# Patient Record
Sex: FEMALE | Race: WHITE | NOT HISPANIC OR LATINO | URBAN - METROPOLITAN AREA
[De-identification: names, ages, dates, MRNs, and addresses within clinical notes are randomized per-mention and may not be internally consistent; named-entity substitution may affect disease eponyms.]

---

## 2017-01-18 ENCOUNTER — OFFICE VISIT - RIVER FALLS (OUTPATIENT)
Dept: FAMILY MEDICINE | Facility: CLINIC | Age: 32
End: 2017-01-18

## 2017-01-18 ASSESSMENT — MIFFLIN-ST. JEOR: SCORE: 1365.14

## 2022-02-11 VITALS
TEMPERATURE: 98.3 F | SYSTOLIC BLOOD PRESSURE: 120 MMHG | DIASTOLIC BLOOD PRESSURE: 62 MMHG | BODY MASS INDEX: 29.81 KG/M2 | HEART RATE: 76 BPM | WEIGHT: 162 LBS | HEIGHT: 62 IN

## 2022-02-16 NOTE — PROGRESS NOTES
Patient:   LESLIE GRIDER            MRN: [id number]            FIN: [id number]               Age:   31 years     Sex:  Female     :  1985   Associated Diagnoses:   Bacterial conjunctivitis   Author:   Dmitri KHAN, [place]      Report Summary   Diagnosis  Bacterial conjunctivitis ([id number] H10.89).  Patient InstructionsOrders   Visit Information   Visit type:  New symptom.    Accompanied by:  No one.    Source of history:  Self.    Referral source:  Self.    History limitation:  None.       Chief Complaint   2017 3:42 PM CST    New patient- Right eye pain and pus x 2 days        History of Present Illness             The patient presents with eye discharge.  The eye discharge is located in the right eye.  The eye discharge is described as a moderate amount, crusty and green-colored.  The severity of the eye discharge is moderate.  The eye discharge is worsening.  Went swimming with contacts in over the weekend. No change to contact care. Vision fine with glasses. Purulent discharge right eye with injection. Itchy. CC above noted and confirmed with the patient..  There are no modifying factors.  Associated symptoms consist of itchy eyes.  CC above noted and confirmed with the patient..        Review of Systems   Constitutional:  Negative.    Eye:  Negative except as documented in history of present illness.    Ear/Nose/Mouth/Throat:  Nasal congestion.    Respiratory:  Cough.    Gastrointestinal:  Negative.    Genitourinary:  Negative.    Hematology/Lymphatics:  Negative.    Endocrine:  Negative.    Immunologic:  Negative.    Musculoskeletal:  Muscle pain.    Integumentary:  Negative.    Neurologic:  Headache.       Health Status   Allergies:    Allergic Reactions (All)  No Known Medication Allergies   Problem list:    All Problems  Obesity / SNOMED CT 3516671372 / Probable   Medications:  (Selected)   Prescriptions  Prescribed  ciprofloxacin 0.3% ophthalmic solution: 1 drop, both eyes, tid, x 5 day(s), #  5 mL, 0 Refill(s), Type: Acute, Pharmacy: [id number], 1 drop both eyes tid,x5 day(s)      Histories   Past Medical History:    No active or resolved past medical history items have been selected or recorded.   Family History:    No family history items have been selected or recorded.   Procedure history:    No active procedure history items have been selected or recorded.   Social History:             No active social history items have been recorded.      Physical Examination   Vital Signs   1/18/2017 3:42 PM CST Temperature Tympanic 98.3 DegF    Peripheral Pulse Rate 76 bpm    Pulse Site Radial artery    HR Method Manual    Systolic Blood Pressure 120 mmHg    Diastolic Blood Pressure 62 mmHg    Mean Arterial Pressure 81 mmHg    BP Site Left arm    BP Method Manual      Measurements from flowsheet : Measurements   1/18/2017 3:42 PM CST Height Measured - Standard 61.5 in    Weight Measured - Standard 162 lb    BSA 1.78 m2    Body Mass Index 30.11 kg/m2      General:  Alert and oriented, No acute distress.    Eye:  Pupils are equal, round and reactive to light, Extraocular movements are intact.         Conjunctiva: Right, With conjunctivitis, Injected, Crust at lid margin..    HENT:  Normocephalic, Tympanic membranes are clear, Oral mucosa is moist, No pharyngeal erythema.    Neck:  Supple, No lymphadenopathy.    Respiratory:  Lungs are clear to auscultation, Respirations are non-labored, Breath sounds are equal.    Cardiovascular:  Normal rate, Regular rhythm, No murmur.       Impression and Plan   Diagnosis     Bacterial conjunctivitis ([id number] H10.89).     Patient Instructions:       Counseled: Family, Regarding diagnosis, Regarding treatment, Regarding medications, Activity, Verbalized understanding.    Orders     Orders (Selected)   Prescriptions  Prescribed  ciprofloxacin 0.3% ophthalmic solution: 1 drop, both eyes, tid, x 5 day(s), # 5 mL, 0 Refill(s), Type: Acute, Pharmacy: [facility] #[id number],  1 drop both eyes tid,x5 day(s).     Recheck if not improving in next few days, emergently if decreased vision or eye pain. Leave contacts out until next week.